# Patient Record
Sex: MALE | Race: WHITE | NOT HISPANIC OR LATINO
[De-identification: names, ages, dates, MRNs, and addresses within clinical notes are randomized per-mention and may not be internally consistent; named-entity substitution may affect disease eponyms.]

---

## 2019-12-06 ENCOUNTER — TRANSCRIPTION ENCOUNTER (OUTPATIENT)
Age: 29
End: 2019-12-06

## 2020-03-30 ENCOUNTER — APPOINTMENT (OUTPATIENT)
Dept: DISASTER EMERGENCY | Facility: CLINIC | Age: 30
End: 2020-03-30
Payer: COMMERCIAL

## 2020-03-30 ENCOUNTER — LABORATORY RESULT (OUTPATIENT)
Age: 30
End: 2020-03-30

## 2020-03-30 VITALS
SYSTOLIC BLOOD PRESSURE: 120 MMHG | DIASTOLIC BLOOD PRESSURE: 80 MMHG | RESPIRATION RATE: 16 BRPM | TEMPERATURE: 98.7 F | OXYGEN SATURATION: 98 % | HEART RATE: 56 BPM

## 2020-03-30 DIAGNOSIS — J45.909 UNSPECIFIED ASTHMA, UNCOMPLICATED: ICD-10-CM

## 2020-03-30 DIAGNOSIS — R50.9 FEVER, UNSPECIFIED: ICD-10-CM

## 2020-03-30 DIAGNOSIS — R68.89 OTHER GENERAL SYMPTOMS AND SIGNS: ICD-10-CM

## 2020-03-30 DIAGNOSIS — R05 COUGH: ICD-10-CM

## 2020-03-30 DIAGNOSIS — Z87.2 PERSONAL HISTORY OF DISEASES OF THE SKIN AND SUBCUTANEOUS TISSUE: ICD-10-CM

## 2020-03-30 DIAGNOSIS — Z03.818 ENCOUNTER FOR OBSERVATION FOR SUSPECTED EXPOSURE TO OTHER BIOLOGICAL AGENTS RULED OUT: ICD-10-CM

## 2020-03-30 DIAGNOSIS — Z86.59 PERSONAL HISTORY OF OTHER MENTAL AND BEHAVIORAL DISORDERS: ICD-10-CM

## 2020-03-30 PROCEDURE — 99213 OFFICE O/P EST LOW 20 MIN: CPT

## 2020-03-30 RX ORDER — IXEKIZUMAB 80 MG/ML
INJECTION, SOLUTION SUBCUTANEOUS
Refills: 0 | Status: ACTIVE | COMMUNITY

## 2020-05-09 PROBLEM — R50.9 FEVER: Status: ACTIVE | Noted: 2020-05-09

## 2020-05-09 PROBLEM — R05 COUGH: Status: ACTIVE | Noted: 2020-05-09

## 2020-05-09 PROBLEM — Z03.818 ENCOUNTER FOR OBSERVATION FOR SUSPECTED EXPOSURE TO OTHER BIOLOGICAL AGENTS RULED OUT: Status: ACTIVE | Noted: 2020-05-09

## 2020-05-09 PROBLEM — R68.89 SUSPECTED 2019 NOVEL CORONAVIRUS INFECTION: Status: ACTIVE | Noted: 2020-03-30

## 2020-05-09 NOTE — PHYSICAL EXAM
[No Acute Distress] : no acute distress [Normal Voice/Communication] : normal voice/communication [Normal Sclera/Conjunctiva] : normal sclera/conjunctiva [Normal Outer Ear/Nose] : the outer ears and nose were normal in appearance [No Respiratory Distress] : no respiratory distress  [No Accessory Muscle Use] : no accessory muscle use [Clear to Auscultation] : lungs were clear to auscultation bilaterally [Regular Rhythm] : with a regular rhythm [de-identified] : no wheezing  [de-identified] : bradycardia (athlete)

## 2020-05-09 NOTE — ASSESSMENT
[FreeTextEntry1] : Suspect for COVID.  Nasopharyngeal swab collected.   Patient education provided for COVID19.  Explained increased symptoms and SOB ,the patient will go to the ED.  Encouraged fluid intake for fever.   Discussed isolation precautions and handwashing techniques.  Social distancing reviewed.  Utilize Tylenol for fever over 100.4.  No signs of cardiovascular decompensation.  Patient understands the instructions.   Test results may take up to 3-7 days to return.  Discussed the possibility of false negative results.  Instructed to self quarantine and must remain quarantined x 14 days and no fever for 3 days without antifever medications.  All patients close contacts should also self quarantine.  Social distancing once quarantine is completed.  If the patient has symptoms of chest discomfort, severe shortness of breath at rest, worsening shortness of breath with minimal exertion, new or worsening wheezing, dizziness were instructed to seek immediate medical evaluation.\par \par \par

## 2020-05-09 NOTE — HISTORY OF PRESENT ILLNESS
[___ Days ago] : [unfilled] days ago [Congestion] : congestion [Cough] : cough [Chills] : chills [Shortness Of Breath] : shortness of breath [Fatigue] : fatigue [Headache] : headache [Fever] : fever [Tmax= ___] : Tmax = [unfilled] [OTC Remedies] : OTC remedies [Sore Throat] : sore throat [Improving] : improving [Wheezing] : no wheezing [Earache] : no earache [Nausea] : no nausea [Vomiting] : no vomiting [Constipation] : no constipation [Diarrhea] : no diarrhea [Anorexia] : no anorexia [Abdominal Pain] : no abdominal pain [FreeTextEntry5] : Tylenol [de-identified] : No GI s/s  [FreeTextEntry8] : 6 days of cough and fever \par 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

## 2021-10-14 ENCOUNTER — NON-APPOINTMENT (OUTPATIENT)
Age: 31
End: 2021-10-14

## 2021-10-14 ENCOUNTER — APPOINTMENT (OUTPATIENT)
Dept: UROLOGY | Facility: CLINIC | Age: 31
End: 2021-10-14
Payer: COMMERCIAL

## 2021-10-14 VITALS — SYSTOLIC BLOOD PRESSURE: 130 MMHG | HEART RATE: 75 BPM | DIASTOLIC BLOOD PRESSURE: 77 MMHG

## 2021-10-14 DIAGNOSIS — Z80.0 FAMILY HISTORY OF MALIGNANT NEOPLASM OF DIGESTIVE ORGANS: ICD-10-CM

## 2021-10-14 DIAGNOSIS — Z83.3 FAMILY HISTORY OF DIABETES MELLITUS: ICD-10-CM

## 2021-10-14 DIAGNOSIS — Z82.49 FAMILY HISTORY OF ISCHEMIC HEART DISEASE AND OTHER DISEASES OF THE CIRCULATORY SYSTEM: ICD-10-CM

## 2021-10-14 DIAGNOSIS — Z78.9 OTHER SPECIFIED HEALTH STATUS: ICD-10-CM

## 2021-10-14 PROCEDURE — 99203 OFFICE O/P NEW LOW 30 MIN: CPT

## 2021-10-14 RX ORDER — CLONAZEPAM 1 MG/1
1 TABLET ORAL
Refills: 0 | Status: ACTIVE | COMMUNITY
Start: 2021-10-14

## 2021-10-14 NOTE — PHYSICAL EXAM
[General Appearance - Well Developed] : well developed [General Appearance - Well Nourished] : well nourished [Normal Appearance] : normal appearance [Well Groomed] : well groomed [General Appearance - In No Acute Distress] : no acute distress [Edema] : no peripheral edema [Respiration, Rhythm And Depth] : normal respiratory rhythm and effort [] : no respiratory distress [Exaggerated Use Of Accessory Muscles For Inspiration] : no accessory muscle use [Abdomen Soft] : soft [Abdomen Tenderness] : non-tender [Abdomen Hernia] : no hernia was discovered [Urethral Meatus] : meatus normal [Penis Abnormality] : normal circumcised penis [Urinary Bladder Findings] : the bladder was normal on palpation [Scrotum] : the scrotum was normal [Epididymis] : the epididymides were normal [Testes Tenderness] : no tenderness of the testes [Testes Mass (___cm)] : there were no testicular masses [Normal Station and Gait] : the gait and station were normal for the patient's age [Oriented To Time, Place, And Person] : oriented to person, place, and time [Affect] : the affect was normal [Mood] : the mood was normal [Not Anxious] : not anxious [FreeTextEntry1] : 2 ~2mm right palpable inguinal lymph nodes

## 2021-10-14 NOTE — HISTORY OF PRESENT ILLNESS
[FreeTextEntry1] : C/o rash to tip of penis, intermittent x 5 years, was treated with various creams and is currently on Taltz, prescribed by Dermatology.  He was told by derm the area of concern was psoriasis. Taltz improved the rash slightly. Rash does not itch, STD workup negative. Wife recently diagnosed with HPV 16,18,31. Awakens 2-3x/night to void. He tries to taper fluids at night.  Denies slow stream, dysuria. [Nocturia] : nocturia [None] : None

## 2021-10-14 NOTE — ASSESSMENT
[FreeTextEntry1] : Penile lesion\par \par Schedule penile biopsy, procedure explained, pt agreeable\par Emphasized condom use

## 2021-10-22 ENCOUNTER — APPOINTMENT (OUTPATIENT)
Dept: UROLOGY | Facility: CLINIC | Age: 31
End: 2021-10-22
Payer: COMMERCIAL

## 2021-10-22 VITALS — DIASTOLIC BLOOD PRESSURE: 76 MMHG | HEART RATE: 64 BPM | SYSTOLIC BLOOD PRESSURE: 124 MMHG

## 2021-10-22 PROCEDURE — 54100 BIOPSY OF PENIS: CPT

## 2021-11-02 ENCOUNTER — APPOINTMENT (OUTPATIENT)
Dept: UROLOGY | Facility: CLINIC | Age: 31
End: 2021-11-02

## 2021-11-12 ENCOUNTER — APPOINTMENT (OUTPATIENT)
Dept: UROLOGY | Facility: CLINIC | Age: 31
End: 2021-11-12
Payer: COMMERCIAL

## 2021-11-12 VITALS — HEART RATE: 52 BPM | DIASTOLIC BLOOD PRESSURE: 82 MMHG | SYSTOLIC BLOOD PRESSURE: 136 MMHG

## 2021-11-12 DIAGNOSIS — N48.9 DISORDER OF PENIS, UNSPECIFIED: ICD-10-CM

## 2021-11-12 LAB — CORE LAB BIOPSY: NORMAL

## 2021-11-12 PROCEDURE — 99213 OFFICE O/P EST LOW 20 MIN: CPT

## 2021-11-22 PROBLEM — N48.9 PENILE LESION: Status: ACTIVE | Noted: 2021-10-22

## 2021-11-22 NOTE — PHYSICAL EXAM
[General Appearance - Well Developed] : well developed [General Appearance - Well Nourished] : well nourished [General Appearance - In No Acute Distress] : no acute distress [FreeTextEntry1] : biopsy sites well healed [] : no respiratory distress [Oriented To Time, Place, And Person] : oriented to person, place, and time